# Patient Record
Sex: FEMALE | URBAN - METROPOLITAN AREA
[De-identification: names, ages, dates, MRNs, and addresses within clinical notes are randomized per-mention and may not be internally consistent; named-entity substitution may affect disease eponyms.]

---

## 2020-09-18 ENCOUNTER — APPOINTMENT (RX ONLY)
Dept: URBAN - METROPOLITAN AREA CLINIC 325 | Facility: CLINIC | Age: 40
Setting detail: DERMATOLOGY
End: 2020-09-18

## 2020-09-18 PROBLEM — Z01.84 ENCOUNTER FOR ANTIBODY RESPONSE EXAMINATION: Status: ACTIVE | Noted: 2020-09-18

## 2020-09-18 PROCEDURE — ? RAPID COVID-19 TEST

## 2020-09-18 PROCEDURE — U0002 COVID-19 LAB TEST NON-CDC: HCPCS

## 2020-09-18 NOTE — PROCEDURE: RAPID COVID-19 TEST
Test Performance Details: SARS-CoV antibody IgM and IgG are NEGATIVE.  This sample does not contain detectable SARS-CoV-2 IgM or IgG antibodies.  This negative result willingham not rule out SARS-CoV-2 infection.  Correlation with epidemiologic risk factors and other clinical laboratory findings is recommended.  Serologic results should not be used as the sole basis to diagnose or exclude recent SARS-CoV-2 infection.

## 2020-09-18 NOTE — PROCEDURE: RAPID COVID-19 TEST
Comments: *This test has been validated and developed by [email or web address].  The FDA has been notified.\\n*This test has not yet been reviewed nor approved by the FDA.\\n*This test is authorized by FDA under an Emergency Use Authorization (EUA).  This test is only authorized for the duration of the declaration that circumstances exist justifying the authorization of emergency use of in vitro diagnostics for detection and/or diagnosis of COVID-19 under Section 564(b)(1) of the Act, 21 U.S.C. 360bbb-3 (b)(1), unless the authorization is terminated or revoked sooner.  This test has been authorized only for detecting the presence of antibodies against SARS-Cov-2, not for any other viruses or pathogens.\\n*Negative antibody result does not rule out SARS-CoV-2 infection.\\n*Follow-up testing with a molecular diagnostic test should be considered to rule out infection.\\n*This antibody test result should not be used as the sold basis to diagnose or exclude SARS-CoV-2 infection or to inform infection status.\\n Comments: *This test has been validated and developed by [employer].  The FDA has been notified.\\n*This test has not yet been reviewed nor approved by the FDA.\\n*This test is authorized by FDA under an Emergency Use Authorization (EUA).  This test is only authorized for the duration of the declaration that circumstances exist justifying the authorization of emergency use of in vitro diagnostics for detection and/or diagnosis of COVID-19 under Section 564(b)(1) of the Act, 21 U.S.C. 360bbb-3 (b)(1), unless the authorization is terminated or revoked sooner.  This test has been authorized only for detecting the presence of antibodies against SARS-Cov-2, not for any other viruses or pathogens.\\n*Negative antibody result does not rule out SARS-CoV-2 infection.\\n*Follow-up testing with a molecular diagnostic test should be considered to rule out infection.\\n*This antibody test result should not be used as the sold basis to diagnose or exclude SARS-CoV-2 infection or to inform infection status.\\n